# Patient Record
Sex: MALE
[De-identification: names, ages, dates, MRNs, and addresses within clinical notes are randomized per-mention and may not be internally consistent; named-entity substitution may affect disease eponyms.]

---

## 2023-06-20 ENCOUNTER — APPOINTMENT (OUTPATIENT)
Dept: UROLOGY | Facility: CLINIC | Age: 33
End: 2023-06-20
Payer: COMMERCIAL

## 2023-06-20 ENCOUNTER — TRANSCRIPTION ENCOUNTER (OUTPATIENT)
Age: 33
End: 2023-06-20

## 2023-06-20 VITALS
WEIGHT: 202 LBS | SYSTOLIC BLOOD PRESSURE: 123 MMHG | OXYGEN SATURATION: 96 % | HEART RATE: 93 BPM | DIASTOLIC BLOOD PRESSURE: 78 MMHG | RESPIRATION RATE: 16 BRPM | HEIGHT: 77 IN | TEMPERATURE: 98.5 F | BODY MASS INDEX: 23.85 KG/M2

## 2023-06-20 DIAGNOSIS — N41.9 INFLAMMATORY DISEASE OF PROSTATE, UNSPECIFIED: ICD-10-CM

## 2023-06-20 PROBLEM — Z00.00 ENCOUNTER FOR PREVENTIVE HEALTH EXAMINATION: Status: ACTIVE | Noted: 2023-06-20

## 2023-06-20 PROCEDURE — 99204 OFFICE O/P NEW MOD 45 MIN: CPT

## 2023-06-23 LAB
APPEARANCE: ABNORMAL
BACTERIA UR CULT: NORMAL
BACTERIA: NEGATIVE /HPF
BILIRUBIN URINE: NEGATIVE
BLOOD URINE: NEGATIVE
C TRACH RRNA SPEC QL NAA+PROBE: NOT DETECTED
CAST: 0 /LPF
COLOR: YELLOW
EPITHELIAL CELLS: 0 /HPF
GLUCOSE QUALITATIVE U: NEGATIVE MG/DL
KETONES URINE: NEGATIVE MG/DL
LEUKOCYTE ESTERASE URINE: NEGATIVE
MICROSCOPIC-UA: NORMAL
N GONORRHOEA RRNA SPEC QL NAA+PROBE: NOT DETECTED
NITRITE URINE: NEGATIVE
PH URINE: 7.5
PROTEIN URINE: NEGATIVE MG/DL
RED BLOOD CELLS URINE: 1 /HPF
SOURCE AMPLIFICATION: NORMAL
SOURCE AMPLIFICATION: NORMAL
SPECIFIC GRAVITY URINE: 1.02
T VAGINALIS RRNA SPEC QL NAA+PROBE: NOT DETECTED
UROBILINOGEN URINE: 0.2 MG/DL
WHITE BLOOD CELLS URINE: 0 /HPF

## 2023-06-25 NOTE — HISTORY OF PRESENT ILLNESS
[FreeTextEntry1] : Language: English\par Date of First visit: 06/20/2023 \par Accompanied by: self\par Contact info: \par Referring Provider/PCP:  \par Fax: \par \par \par \par CC/ Problem List:\par prostatitis\par ===============================================================================\par FIRST VISIT / Summary:\par Very pleasant 33 year old M here for discussion of 1 week urinary urge and feeling something still there. Went to ER and says that urine test was negative. States was not tested for STI but was negative within the last year. Only sexually active with wife. Feels he has to push and has pressure inside. He does have constipation, taking docusate / miralax. Drinks around 5-6 cups 8oz plus some tea and coffee and smoothies. \par \par -------------------------------------------------------------------------------------------\par INTERVAL VISITS:\par \par ===============================================================================\par \par PMH: GERD, anxiety\par Meds: sertraline, omeprazole\par All: nkda\par FHx: No  malignancies. Father with prostatitis \par SocHx: never smoker, no etoh\par \par PSH: anal fissure repair\par \par \par ROS: Review of Systems is as per HPI unless otherwise denoted below\par \par \par ===============================================================================\par DATA: \tyler \par LABS (SELECTED):---------------------------------------------------------------------------------------------------\par \par \par RADS:-------------------------------------------------------------------------------------------------------------------\par \par \par PATHOLOGY/CYTOLOGY:-------------------------------------------------------------------------------------------\par \par \par VOIDING STUDIES: ----------------------------------------------------------------------------------------------------\par \par \par STONE STUDIES: (Analysis/LLSA)----------------------------------------------------------------------------------\par \par \par PROCEDURES: -----------------------------------------------------------------------------------------------\par \par \par \par \par ===============================================================================\par \par PHYSICAL EXAM:\par \par GEN: AAOx3, NAD\par \par PSYCH: Appropriate Behavior, Affect Congruent\par \par Lungs: No labored breathing\par \par GAIT: Gait normal, Stability good\par \par ABD: no suprapubic or CVAT\par \par \par  FOCUSED: ----------------------------------------------------------------------------------------------------------------\par \par \par =======================================================================================\par DISCUSSION: \par I explained to the patient that male pelvic pain is one of the most difficult things in Urology to diagnose and treat. I explained to the patient that in my mind the differential diagnosis includes:\par -- musculoskeletal issues (SI joint pain, piriformis syndrome, etc)\par -- Inflammatory and infectious causes (GC, CT, UTI, prostatitis)\par -- Functional issues: Pelvic floor dysfunction, primary BNO, DO\par -- Anatomic issues: tumors, strictures\par \par For men with the diagnosis of prostatitis, there are four NIH categories:\par Category I: Acute bacterial prostatitis\par Category II: Chronic bacterial prostatitis\par Category III: Chronic pelvic pain syndrome: Genitourinary pain without pathogenic bacteria\par 	IIIA: Inflammatory CPPS: Excessive WBC in prostatic secretions\par 	IIIB: Non-inflammatory CPPS: No significant WBC in prostatic fluid\par Category IV: Asymptomatic Inflammatory prostatitis\par \par \par Given this differential diagnosis we discussed his options:\par 1. Checking for infections: urine culture / STI testing\par 2. Avoid dietary triggers (smoking, alcohol / drug use, caffeine, acidic and spicy foods)\par 3. Anti-inflammatory medications (ibuprofen 400 TID for 5 days, cetirizine 10mg daily, Phenazopyridine for 2-3 days)\par 4. Tamsulosin: We discussed how this works.  The patient understands that this medication may cause dizziness, retrograde ejaculation or nasal congestion among other complications. I recommended that the patient take this medication at night. If the side effects become too bothersome, the medication can be discontinued.\par 5. Urodynamics: I explained how urodynamics are performed: a small catheter is placed into the rectum and the bladder and the patient's bladder will be filled. The patient will be asked to perform maneuvers during this and once full, will be asked to void. X-rays will be taken throughout and this will help us establish the cause of the patient's symptoms.\par 6. Cystoscopy to r/o anatomic issues\par 7. One month of antibiotics: we discussed the rationale behind this\par 8. Pelvic Floor Physical therapy \par \par =======================================================================================\par ASSESSMENT and PLAN\par \par \par 1. Prostatitis\par - recommend anti-inflammatories\par - tried azo, mannose, and tamsulosin already no improvement\par - he will consider other tests, for now may worsen symptoms with low chance of identifying any abnormality\par - RTC as needed\par \par \par \par =======================================================================================\par The total time personally spent preparing for this visit (reviewing test results, obtaining external history) and during the visit (ordering tests/medications, spent face to face with the patient / family and counseling them on the above), as well as after the visit (on clinical documentation and coordination with other care providers) was approximately 45 minutes. \par Thank you for allowing me to assist in the care of your patient. Should you have any questions please do not hesitate to reach out to me.\par \par \par Capo Moncada MD                                                            \par [facility]\par [facility]\par \par [place] Office:\par [address], [address]   \[place] [address]    \par T: [phone]    \par F: [phone]    \par \par [place] Office:\par [address], [address]\[place] [address]\par T: [phone]\par F: [phone]

## 2023-06-25 NOTE — HISTORY OF PRESENT ILLNESS
[FreeTextEntry1] : Language: English\par Date of First visit: 06/20/2023 \par Accompanied by: self\par Contact info: \par Referring Provider/PCP:  \par Fax: \par \par \par \par CC/ Problem List:\par prostatitis\par ===============================================================================\par FIRST VISIT / Summary:\par Very pleasant 33 year old M here for discussion of 1 week urinary urge and feeling something still there. Went to ER and says that urine test was negative. States was not tested for STI but was negative within the last year. Only sexually active with wife. Feels he has to push and has pressure inside. He does have constipation, taking docusate / miralax. Drinks around 5-6 cups 8oz plus some tea and coffee and smoothies. \par \par -------------------------------------------------------------------------------------------\par INTERVAL VISITS:\par \par ===============================================================================\par \par PMH: GERD, anxiety\par Meds: sertraline, omeprazole\par All: nkda\par FHx: No  malignancies. Father with prostatitis \par SocHx: never smoker, no etoh\par \par PSH: anal fissure repair\par \par \par ROS: Review of Systems is as per HPI unless otherwise denoted below\par \par \par ===============================================================================\par DATA: \tyler \par LABS (SELECTED):---------------------------------------------------------------------------------------------------\par \par \par RADS:-------------------------------------------------------------------------------------------------------------------\par \par \par PATHOLOGY/CYTOLOGY:-------------------------------------------------------------------------------------------\par \par \par VOIDING STUDIES: ----------------------------------------------------------------------------------------------------\par \par \par STONE STUDIES: (Analysis/LLSA)----------------------------------------------------------------------------------\par \par \par PROCEDURES: -----------------------------------------------------------------------------------------------\par \par \par \par \par ===============================================================================\par \par PHYSICAL EXAM:\par \par GEN: AAOx3, NAD\par \par PSYCH: Appropriate Behavior, Affect Congruent\par \par Lungs: No labored breathing\par \par GAIT: Gait normal, Stability good\par \par ABD: no suprapubic or CVAT\par \par \par  FOCUSED: ----------------------------------------------------------------------------------------------------------------\par \par \par =======================================================================================\par DISCUSSION: \par I explained to the patient that male pelvic pain is one of the most difficult things in Urology to diagnose and treat. I explained to the patient that in my mind the differential diagnosis includes:\par -- musculoskeletal issues (SI joint pain, piriformis syndrome, etc)\par -- Inflammatory and infectious causes (GC, CT, UTI, prostatitis)\par -- Functional issues: Pelvic floor dysfunction, primary BNO, DO\par -- Anatomic issues: tumors, strictures\par \par For men with the diagnosis of prostatitis, there are four NIH categories:\par Category I: Acute bacterial prostatitis\par Category II: Chronic bacterial prostatitis\par Category III: Chronic pelvic pain syndrome: Genitourinary pain without pathogenic bacteria\par 	IIIA: Inflammatory CPPS: Excessive WBC in prostatic secretions\par 	IIIB: Non-inflammatory CPPS: No significant WBC in prostatic fluid\par Category IV: Asymptomatic Inflammatory prostatitis\par \par \par Given this differential diagnosis we discussed his options:\par 1. Checking for infections: urine culture / STI testing\par 2. Avoid dietary triggers (smoking, alcohol / drug use, caffeine, acidic and spicy foods)\par 3. Anti-inflammatory medications (ibuprofen 400 TID for 5 days, cetirizine 10mg daily, Phenazopyridine for 2-3 days)\par 4. Tamsulosin: We discussed how this works.  The patient understands that this medication may cause dizziness, retrograde ejaculation or nasal congestion among other complications. I recommended that the patient take this medication at night. If the side effects become too bothersome, the medication can be discontinued.\par 5. Urodynamics: I explained how urodynamics are performed: a small catheter is placed into the rectum and the bladder and the patient's bladder will be filled. The patient will be asked to perform maneuvers during this and once full, will be asked to void. X-rays will be taken throughout and this will help us establish the cause of the patient's symptoms.\par 6. Cystoscopy to r/o anatomic issues\par 7. One month of antibiotics: we discussed the rationale behind this\par 8. Pelvic Floor Physical therapy \par \par =======================================================================================\par ASSESSMENT and PLAN\par \par \par 1. Prostatitis\par - recommend anti-inflammatories\par - tried azo, mannose, and tamsulosin already no improvement\par - he will consider other tests, for now may worsen symptoms with low chance of identifying any abnormality\par - RTC as needed\par \par \par \par =======================================================================================\par The total time personally spent preparing for this visit (reviewing test results, obtaining external history) and during the visit (ordering tests/medications, spent face to face with the patient / family and counseling them on the above), as well as after the visit (on clinical documentation and coordination with other care providers) was approximately 45 minutes. \par Thank you for allowing me to assist in the care of your patient. Should you have any questions please do not hesitate to reach out to me.\par \par \par Capo Moncada MD                                                            \par [facility]\par [facility]\par \par [place] Office:\par [address], [address]   \[place] [address]    \par T: [phone]    \par F: [phone]    \par \par [place] Office:\par [address], [address]\[place] [address]\par T: [phone]\par F: [phone]